# Patient Record
Sex: FEMALE
[De-identification: names, ages, dates, MRNs, and addresses within clinical notes are randomized per-mention and may not be internally consistent; named-entity substitution may affect disease eponyms.]

---

## 2023-02-25 ENCOUNTER — NON-APPOINTMENT (OUTPATIENT)
Age: 23
End: 2023-02-25

## 2024-01-29 ENCOUNTER — APPOINTMENT (OUTPATIENT)
Dept: ULTRASOUND IMAGING | Facility: CLINIC | Age: 24
End: 2024-01-29
Payer: MEDICAID

## 2024-01-29 ENCOUNTER — OUTPATIENT (OUTPATIENT)
Dept: OUTPATIENT SERVICES | Facility: HOSPITAL | Age: 24
LOS: 1 days | End: 2024-01-29

## 2024-01-29 PROCEDURE — 76801 OB US < 14 WKS SINGLE FETUS: CPT | Mod: 26

## 2024-01-29 PROCEDURE — 76817 TRANSVAGINAL US OBSTETRIC: CPT | Mod: 26

## 2024-02-05 PROBLEM — Z00.00 ENCOUNTER FOR PREVENTIVE HEALTH EXAMINATION: Status: ACTIVE | Noted: 2024-02-05

## 2024-02-07 ENCOUNTER — APPOINTMENT (OUTPATIENT)
Dept: OBGYN | Facility: CLINIC | Age: 24
End: 2024-02-07
Payer: MEDICAID

## 2024-02-07 ENCOUNTER — NON-APPOINTMENT (OUTPATIENT)
Age: 24
End: 2024-02-07

## 2024-02-07 VITALS
HEIGHT: 65 IN | WEIGHT: 144 LBS | BODY MASS INDEX: 23.99 KG/M2 | DIASTOLIC BLOOD PRESSURE: 70 MMHG | SYSTOLIC BLOOD PRESSURE: 120 MMHG

## 2024-02-07 DIAGNOSIS — N83.201 UNSPECIFIED OVARIAN CYST, RIGHT SIDE: ICD-10-CM

## 2024-02-07 DIAGNOSIS — Z33.2 ENCOUNTER FOR ELECTIVE TERMINATION OF PREGNANCY: ICD-10-CM

## 2024-02-07 DIAGNOSIS — Z78.9 OTHER SPECIFIED HEALTH STATUS: ICD-10-CM

## 2024-02-07 DIAGNOSIS — Z12.4 ENCOUNTER FOR SCREENING FOR MALIGNANT NEOPLASM OF CERVIX: ICD-10-CM

## 2024-02-07 PROCEDURE — 99203 OFFICE O/P NEW LOW 30 MIN: CPT

## 2024-02-07 PROCEDURE — 81025 URINE PREGNANCY TEST: CPT

## 2024-02-08 PROBLEM — N83.201 CYST OF RIGHT OVARY: Status: ACTIVE | Noted: 2024-02-08

## 2024-02-08 PROBLEM — Z12.4 CERVICAL CANCER SCREENING: Status: RESOLVED | Noted: 2024-02-07 | Resolved: 2024-02-08

## 2024-02-08 PROBLEM — Z33.2 TERMINATION OF PREGNANCY (FETUS): Status: RESOLVED | Noted: 2024-02-07 | Resolved: 2024-02-08

## 2024-02-08 PROBLEM — Z78.9 NON-SMOKER: Status: ACTIVE | Noted: 2024-02-07

## 2024-02-08 RX ORDER — MAGNESIUM OXIDE/MAG AA CHELATE 300 MG
CAPSULE ORAL
Refills: 0 | Status: ACTIVE | COMMUNITY

## 2024-02-08 RX ORDER — PARSLEY 450 MG
500 CAPSULE ORAL
Refills: 0 | Status: ACTIVE | COMMUNITY

## 2024-02-08 NOTE — PHYSICAL EXAM
[Chaperone Present] : A chaperone was present in the examining room during all aspects of the physical examination [Appropriately responsive] : appropriately responsive [Alert] : alert [No Acute Distress] : no acute distress [Soft] : soft [Non-tender] : non-tender [Non-distended] : non-distended [Oriented x3] : oriented x3 [Labia Majora] : normal [Labia Minora] : normal [Atrophy] : atrophy [Normal] : normal [Anteversion] : anteverted [Uterine Adnexae] : non-palpable [Adnexa Tenderness On The Right] : tender  [Tenderness] : nontender [Enlarged ___ wks] : not enlarged [Mass ___ cm] : no uterine mass was palpated

## 2024-02-08 NOTE — HISTORY OF PRESENT ILLNESS
[FreeTextEntry1] : 23 P0 here for consult right sided pelvic pain and recent positive pregnancy test.  Was on OCPs, stopped end of December; seen in ER 12/19 for right flank pain, dx with UTI s/p treatment, noted to have 2.8cm right sided follicle/cyst. D/c'd OCPs after that visit - does not desire pregnancy, was told to d/c by ER to help with cysts.  Last intercourse 1/1, used condom.  Positive pregnancy test at home in January seen at health services after, pregnancy test negative, US done and told normal.  Has been feeling right sided pelvic discomfort for past 2 weeks.  Wants second opinion about why she is feeling symptoms.

## 2024-02-09 LAB
HCG UR QL: NEGATIVE
QUALITY CONTROL: YES

## 2024-02-29 ENCOUNTER — APPOINTMENT (OUTPATIENT)
Dept: NEUROLOGY | Facility: CLINIC | Age: 24
End: 2024-02-29
Payer: MEDICAID

## 2024-02-29 VITALS
HEIGHT: 65 IN | BODY MASS INDEX: 23.82 KG/M2 | WEIGHT: 143 LBS | OXYGEN SATURATION: 100 % | HEART RATE: 114 BPM | SYSTOLIC BLOOD PRESSURE: 141 MMHG | DIASTOLIC BLOOD PRESSURE: 90 MMHG

## 2024-02-29 DIAGNOSIS — R42 DIZZINESS AND GIDDINESS: ICD-10-CM

## 2024-02-29 DIAGNOSIS — R29.90 UNSPECIFIED SYMPTOMS AND SIGNS INVOLVING THE NERVOUS SYSTEM: ICD-10-CM

## 2024-02-29 DIAGNOSIS — S06.0XAA CONCUSSION WITH LOSS OF CONSCIOUSNESS STATUS UNKNOWN, INITIAL ENCOUNTER: ICD-10-CM

## 2024-02-29 PROCEDURE — 99204 OFFICE O/P NEW MOD 45 MIN: CPT

## 2024-02-29 RX ORDER — DEXTROAMPHETAMINE SULFATE 10 MG/1
10 TABLET ORAL
Refills: 0 | Status: ACTIVE | COMMUNITY

## 2024-02-29 RX ORDER — DEXTROAMPHETAMINE SACCHARATE, AMPHETAMINE ASPARTATE, DEXTROAMPHETAMINE SULFATE, AND AMPHETAMINE SULFATE 7.5; 7.5; 7.5; 7.5 MG/1; MG/1; MG/1; MG/1
30 TABLET ORAL
Refills: 0 | Status: DISCONTINUED | COMMUNITY
End: 2024-02-29

## 2024-02-29 RX ORDER — METHYLPHENIDATE HYDROCHLORIDE 54 MG/1
54 TABLET, EXTENDED RELEASE ORAL
Refills: 0 | Status: ACTIVE | COMMUNITY

## 2024-03-08 NOTE — DATA REVIEWED
[de-identified] : CT HEAD WITHOUT INTRAVENOUS CONTRAST 2/22/24  HISTORY: Headache, foggy feeling  TECHNIQUE: Multiple axial images were obtained from the skull base to the vertex. Sagittal and coronal reformatted images were obtained from the axial data set. The images were reviewed in brain and bone windows.  COMPARISON: No prior studies are available for comparison.  FINDINGS:  Limited evaluation of the posterior fossa due to artifact.  No acute intracranial hemorrhage. The gray-white matter discrimination is well maintained. No hydrocephalus. The extra-axial spaces and basal cisterns are within normal limits. No midline shift or mass effect present.  The cranial cervical junction is within normal limits. The sella is not expanded. No depressed calvarial fracture. The visualized paranasal sinuses are well aerated. The visualized mastoid air cells are well aerated. The visualized orbits are within normal limits.  IMPRESSION:  No evidence of acute intracranial hemorrhage or midline shift. If there is concern for acute neurologic compromise, recommend MRI of the brain for further evaluation.

## 2024-03-08 NOTE — ASSESSMENT
[FreeTextEntry1] : Pt is 22 yo LH F with hx of PTSD on Adderall who presented to [facility] [facility] on 2/22/24 for increasing brain fogginess/dizziness.  - Likely lingering symptoms of head concussion. Probable component of peripheral vertigo. - pt overall has been improving (reports some improvements after recently stopping Adderall and switching to concerta as well - Pt with subtle right hand/arm numbness/tingling episode recently. Given her age, will evaluate for demyelinating dz vs tia (less likely) vs evidence of recent head contusion - follow up prn after MRI results. If neg and pt continue to improve, will update pt over the phone.

## 2024-03-08 NOTE — ASSESSMENT
[FreeTextEntry1] : Pt is 24 yo LH F with hx of PTSD on Adderall who presented to [place] [facility] on 2/22/24 for increasing brain fogginess/dizziness.  - Likely lingering symptoms of head concussion. Probable component of peripheral vertigo. - pt overall has been improving (reports some improvements after recently stopping Adderall and switching to concerta as well - Pt with subtle right hand/arm numbness/tingling episode recently. Given her age, will evaluate for demyelinating dz vs tia (less likely) vs evidence of recent head contusion - follow up prn after MRI results. If neg and pt continue to improve, will update pt over the phone.

## 2024-03-08 NOTE — HISTORY OF PRESENT ILLNESS
[FreeTextEntry1] : Pt seen for hospital follow up. Pt is 24 yo LH F with hx of PTSD on adderal who presented to [place] [facility] on 2/22/24 for increasing brain fogginess/dizziness.  Pt seen in the office on 2/29/24. Prior chart reviewed.  Pt reports hx of head trauma on 2/13/24. At the time, pt got up from bed at night, started to walk, then felt some sensation of leaning to the left. Subsequently pt fell down, hitting the left side of her head (under her left ear). No Loc but pt reported ongoing room spinning sensation with difficulty focusing (brain fog sensation). Symptom was ongoing with occ nausea, which led to [place] ER visit 1 week after. Pt had CT head done with no sig finding, and wassubsequently discharged home.  Since then, pt with slowly improving sense of dizziness (subtle positional room spinning sensation) and brain fog sensation. Pt however reported one episode of right arm heaviness few days ago (while doing her nails). Otherwise, no HA, no blurry vision  Today, pt initially appeared lethargic/tired, but appeared to be more awake and cooperative as the discussion/examination continued. No HA, no blurry vision today.  Of note, pt with hx of migraine during middle/high school, and was on prn triptans: less HA's in the reent past. Pt also with hx of head trauma in 8th grade (iron skillet accidentally fell on her head from cupboard): pt reports similar "brain fogginess" sensation at that time  Soc Hx: no smoking, occ etoh Fam Hx; no seizures

## 2024-03-08 NOTE — HISTORY OF PRESENT ILLNESS
[FreeTextEntry1] : Pt seen for hospital follow up. Pt is 22 yo LH F with hx of PTSD on adderal who presented to [place] [facility] on 2/22/24 for increasing brain fogginess/dizziness.  Pt seen in the office on 2/29/24. Prior chart reviewed.  Pt reports hx of head trauma on 2/13/24. At the time, pt got up from bed at night, started to walk, then felt some sensation of leaning to the left. Subsequently pt fell down, hitting the left side of her head (under her left ear). No Loc but pt reported ongoing room spinning sensation with difficulty focusing (brain fog sensation). Symptom was ongoing with occ nausea, which led to [place] ER visit 1 week after. Pt had CT head done with no sig finding, and wassubsequently discharged home.  Since then, pt with slowly improving sense of dizziness (subtle positional room spinning sensation) and brain fog sensation. Pt however reported one episode of right arm heaviness few days ago (while doing her nails). Otherwise, no HA, no blurry vision  Today, pt initially appeared lethargic/tired, but appeared to be more awake and cooperative as the discussion/examination continued. No HA, no blurry vision today.  Of note, pt with hx of migraine during middle/high school, and was on prn triptans: less HA's in the reent past. Pt also with hx of head trauma in 8th grade (iron skillet accidentally fell on her head from cupboard): pt reports similar "brain fogginess" sensation at that time  Soc Hx: no smoking, occ etoh Fam Hx; no seizures

## 2024-03-08 NOTE — DATA REVIEWED
[de-identified] : CT HEAD WITHOUT INTRAVENOUS CONTRAST 2/22/24  HISTORY: Headache, foggy feeling  TECHNIQUE: Multiple axial images were obtained from the skull base to the vertex. Sagittal and coronal reformatted images were obtained from the axial data set. The images were reviewed in brain and bone windows.  COMPARISON: No prior studies are available for comparison.  FINDINGS:  Limited evaluation of the posterior fossa due to artifact.  No acute intracranial hemorrhage. The gray-white matter discrimination is well maintained. No hydrocephalus. The extra-axial spaces and basal cisterns are within normal limits. No midline shift or mass effect present.  The cranial cervical junction is within normal limits. The sella is not expanded. No depressed calvarial fracture. The visualized paranasal sinuses are well aerated. The visualized mastoid air cells are well aerated. The visualized orbits are within normal limits.  IMPRESSION:  No evidence of acute intracranial hemorrhage or midline shift. If there is concern for acute neurologic compromise, recommend MRI of the brain for further evaluation.

## 2024-03-08 NOTE — ASSESSMENT
[FreeTextEntry1] : Pt is 24 yo LH F with hx of PTSD on Adderall who presented to [place] [facility] on 2/22/24 for increasing brain fogginess/dizziness.  - Likely lingering symptoms of head concussion. Probable component of peripheral vertigo. - pt overall has been improving (reports some improvements after recently stopping Adderall and switching to concerta as well - Pt with subtle right hand/arm numbness/tingling episode recently. Given her age, will evaluate for demyelinating dz vs tia (less likely) vs evidence of recent head contusion - follow up prn after MRI results. If neg and pt continue to improve, will update pt over the phone.

## 2024-03-08 NOTE — HISTORY OF PRESENT ILLNESS
[FreeTextEntry1] : Pt seen for hospital follow up. Pt is 22 yo LH F with hx of PTSD on adderal who presented to [place] [facility] on 2/22/24 for increasing brain fogginess/dizziness.  Pt seen in the office on 2/29/24. Prior chart reviewed.  Pt reports hx of head trauma on 2/13/24. At the time, pt got up from bed at night, started to walk, then felt some sensation of leaning to the left. Subsequently pt fell down, hitting the left side of her head (under her left ear). No Loc but pt reported ongoing room spinning sensation with difficulty focusing (brain fog sensation). Symptom was ongoing with occ nausea, which led to [place] ER visit 1 week after. Pt had CT head done with no sig finding, and wassubsequently discharged home.  Since then, pt with slowly improving sense of dizziness (subtle positional room spinning sensation) and brain fog sensation. Pt however reported one episode of right arm heaviness few days ago (while doing her nails). Otherwise, no HA, no blurry vision  Today, pt initially appeared lethargic/tired, but appeared to be more awake and cooperative as the discussion/examination continued. No HA, no blurry vision today.  Of note, pt with hx of migraine during middle/high school, and was on prn triptans: less HA's in the reent past. Pt also with hx of head trauma in 8th grade (iron skillet accidentally fell on her head from cupboard): pt reports similar "brain fogginess" sensation at that time  Soc Hx: no smoking, occ etoh Fam Hx; no seizures

## 2024-03-08 NOTE — PHYSICAL EXAM
[FreeTextEntry1] : Neuro Exam MS: AAOx3, follows commands, good comprehension, fund of knowledge appears intact, no aphasia, no dysarthria CN: PERRL, EOMI, peripheral vision intact, v1-v3 intact, hearing intact, no facial asymmetry, tongue & uvula midline, shoulder shrug equal strength Motor: 5/5 no drift, no rigidity, no abnormal atrophy Sensory: Lt/PP intact Coord: no tremors DTR: 2+.

## 2024-03-08 NOTE — DATA REVIEWED
[de-identified] : CT HEAD WITHOUT INTRAVENOUS CONTRAST 2/22/24  HISTORY: Headache, foggy feeling  TECHNIQUE: Multiple axial images were obtained from the skull base to the vertex. Sagittal and coronal reformatted images were obtained from the axial data set. The images were reviewed in brain and bone windows.  COMPARISON: No prior studies are available for comparison.  FINDINGS:  Limited evaluation of the posterior fossa due to artifact.  No acute intracranial hemorrhage. The gray-white matter discrimination is well maintained. No hydrocephalus. The extra-axial spaces and basal cisterns are within normal limits. No midline shift or mass effect present.  The cranial cervical junction is within normal limits. The sella is not expanded. No depressed calvarial fracture. The visualized paranasal sinuses are well aerated. The visualized mastoid air cells are well aerated. The visualized orbits are within normal limits.  IMPRESSION:  No evidence of acute intracranial hemorrhage or midline shift. If there is concern for acute neurologic compromise, recommend MRI of the brain for further evaluation.

## 2024-03-16 ENCOUNTER — RESULT REVIEW (OUTPATIENT)
Age: 24
End: 2024-03-16

## 2024-04-15 ENCOUNTER — NON-APPOINTMENT (OUTPATIENT)
Age: 24
End: 2024-04-15

## 2024-06-17 ENCOUNTER — OUTPATIENT (OUTPATIENT)
Dept: OUTPATIENT SERVICES | Facility: HOSPITAL | Age: 24
LOS: 1 days | End: 2024-06-17

## 2024-06-17 ENCOUNTER — APPOINTMENT (OUTPATIENT)
Dept: ULTRASOUND IMAGING | Facility: CLINIC | Age: 24
End: 2024-06-17
Payer: MEDICAID

## 2024-06-17 ENCOUNTER — TRANSCRIPTION ENCOUNTER (OUTPATIENT)
Age: 24
End: 2024-06-17

## 2024-06-17 PROCEDURE — 76830 TRANSVAGINAL US NON-OB: CPT | Mod: 26

## 2024-06-17 PROCEDURE — 76856 US EXAM PELVIC COMPLETE: CPT | Mod: 26

## 2024-09-30 ENCOUNTER — TRANSCRIPTION ENCOUNTER (OUTPATIENT)
Age: 24
End: 2024-09-30